# Patient Record
Sex: MALE
[De-identification: names, ages, dates, MRNs, and addresses within clinical notes are randomized per-mention and may not be internally consistent; named-entity substitution may affect disease eponyms.]

---

## 2019-08-12 ENCOUNTER — HOSPITAL ENCOUNTER (EMERGENCY)
Dept: HOSPITAL 5 - ED | Age: 24
Discharge: HOME | End: 2019-08-12
Payer: COMMERCIAL

## 2019-08-12 VITALS — DIASTOLIC BLOOD PRESSURE: 58 MMHG | SYSTOLIC BLOOD PRESSURE: 125 MMHG
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
## 2019-08-12 DIAGNOSIS — T78.1XXA: Primary | ICD-10-CM

## 2019-08-12 DIAGNOSIS — Z91.013: ICD-10-CM

## 2019-08-12 PROCEDURE — 99283 EMERGENCY DEPT VISIT LOW MDM: CPT

## 2019-08-12 PROCEDURE — 96375 TX/PRO/DX INJ NEW DRUG ADDON: CPT

## 2019-08-12 PROCEDURE — 96374 THER/PROPH/DIAG INJ IV PUSH: CPT

## 2019-08-12 NOTE — EMERGENCY DEPARTMENT REPORT
HPI





- General


Chief Complaint: Allergic Reaction


Time Seen by Provider: 08/12/19 08:17





- HPI


HPI: 





 is a 24-year-old  male who is here status post allergic reaction. 

Patient believes this is rash happened after eating some shrimp this morning.  

Patient had a diffuse rash and itchiness and shortness of breath.  Patient 

received 50 mg of IV Benadryl prior to arrival.  Patient states his shortness of

breath is improved and his itching is improving as well.  Patient denies syncope

chest pain.  Patient denies nausea vomiting abdominal pain as well.





ED Past Medical Hx





- Past Medical History


Previous Medical History?: No





- Surgical History


Past Surgical History?: No





- Social History


Smoking Status: Never Smoker


Substance Use Type: None





- Medications


Home Medications: 


                                Home Medications











 Medication  Instructions  Recorded  Confirmed  Last Taken  Type


 


Famotidine [Pepcid] 20 mg PO BID #20 tablet 08/12/19  Unknown Rx


 


predniSONE [Deltasone] 20 mg PO QDAY #5 tab 08/12/19  Unknown Rx














ED Review of Systems


ROS: 


Stated complaint: ALLERGIC REACTION (SHRIMP)


Other details as noted in HPI





Comment: All other systems reviewed and negative





Physical Exam





- Physical Exam


Vital Signs: 


                                   Vital Signs











  08/12/19





  07:29


 


Temperature 98.5 F


 


Pulse Rate 88


 


Respiratory 18





Rate 


 


Blood Pressure 125/58


 


O2 Sat by Pulse 99





Oximetry 











General: 





Patient is alert and oriented 3 in no acute distress.


Physical Exam: 





The patient's heart exam shows normal heart tones.  Rest her exam shows lungs 

are clear to auscultation.  ENT exam shows no pharyngeal erythema.  Skin exam is

 normal.  Abdomen soft nontender.





ED Course


                                   Vital Signs











  08/12/19





  07:29


 


Temperature 98.5 F


 


Pulse Rate 88


 


Respiratory 18





Rate 


 


Blood Pressure 125/58


 


O2 Sat by Pulse 99





Oximetry 














ED Medical Decision Making





- Medical Decision Making





Patient was monitored here for a rebound of his allergic reaction.  Given Solu-

Medrol Pepcid.  Patient is remained well over the last 2 hours of monitoring.  

Patient be discharged home at this time.


Critical care attestation.: 


If time is entered above; I have spent that time in minutes in the direct care 

of this critically ill patient, excluding procedure time.








ED Disposition


Clinical Impression: 


 Food allergy





Allergic reaction


Qualifiers:


 Encounter type: initial encounter Qualified Code(s): T78.40XA - Allergy, 

unspecified, initial encounter





Disposition: DC-01 TO HOME OR SELFCARE


Is pt being admited?: No


Does the pt Need Aspirin: No


Condition: Stable


Instructions:  Food Allergy (ED)


Additional Instructions: 


Please take 25 mg of Benadryl 3 times a day for the next 3 days


Referrals: 


[facility], [place] [Primary Care Provider] - 3-5 Days


Time of Disposition: 09:42